# Patient Record
Sex: FEMALE | Race: BLACK OR AFRICAN AMERICAN | NOT HISPANIC OR LATINO | Employment: OTHER | URBAN - NONMETROPOLITAN AREA
[De-identification: names, ages, dates, MRNs, and addresses within clinical notes are randomized per-mention and may not be internally consistent; named-entity substitution may affect disease eponyms.]

---

## 2024-07-19 ENCOUNTER — OFFICE VISIT (OUTPATIENT)
Dept: OTOLARYNGOLOGY | Facility: CLINIC | Age: 69
End: 2024-07-19
Payer: MEDICARE

## 2024-07-19 DIAGNOSIS — H93.13 TINNITUS OF BOTH EARS: Primary | ICD-10-CM

## 2024-07-19 DIAGNOSIS — H91.93 BILATERAL HEARING LOSS, UNSPECIFIED HEARING LOSS TYPE: ICD-10-CM

## 2024-07-19 DIAGNOSIS — H91.93 DECREASED HEARING OF BOTH EARS: ICD-10-CM

## 2024-07-19 PROCEDURE — 1159F MED LIST DOCD IN RCRD: CPT | Performed by: NURSE PRACTITIONER

## 2024-07-19 PROCEDURE — 1160F RVW MEDS BY RX/DR IN RCRD: CPT | Performed by: NURSE PRACTITIONER

## 2024-07-19 PROCEDURE — 99203 OFFICE O/P NEW LOW 30 MIN: CPT | Performed by: NURSE PRACTITIONER

## 2024-07-26 NOTE — PROGRESS NOTES
YONI Weldon  [facility] EAR NOSE & THROAT  [address], [address]  [facility] [address]  Fax [phone]  Phone [phone]      Visit Type: NEW PATIENT   Chief Complaint   Patient presents with    [facility]     NEW PATIENT, Unspecified hearing loss, bilateral, REFERRED BY ISIDRA CORONADO  Rosa Luis is a 69 y.o. female who presents for evaluation of for ear complaints.  She admits to hearing loss and tinnitus to ears bilaterally.  This has been present for years, progressively getting worse.  Tinnitus is described as constant, buzzing.  States this does wax and wane but denies notable improving or worsening factors.  Denies ear pain drainage fullness or dizziness.      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 No pertinent past medical history.    No past surgical history on file.    Family History: Her family history is not on file.     Social History: She  has no history on file for tobacco use, alcohol use, and drug use.    Home Medications:       Allergies:  She has No Known Allergies.       Vital Signs:      ENT Physical Exam  Constitutional  Appearance: patient appears well-developed, well-nourished and well-groomed,  Communication/Voice: communication appropriate for developmental age; vocal quality normal;  Head and Face  Appearance: head appears normal, face appears normal and face appears atraumatic;  Palpation: facial palpation normal;  Ear  Hearing: intact;  Auricles: bilateral auricles normal;  External Mastoids: bilateral external mastoids normal;  Ear Canals: bilateral ear canals normal;  Tympanic Membranes: bilateral tympanic membranes normal;  Nose  External Nose: nares patent bilaterally; external nose normal;  Internal Nose: nasal mucosa normal; bilateral inferior turbinates normal;  Oral Cavity/Oropharynx  Lips: normal;  Teeth: normal;  Gums: gingiva normal;  Tongue:  normal;  Oral mucosa: normal;  Hard palate: normal;  Soft palate: normal;  Tonsils: normal;  Neck  Neck: neck normal; neck palpation normal;  Respiratory  Inspection: breathing unlabored;  Cardiovascular  Inspection: extremities are warm and well perfused;  Neurovestibular  Mental Status: alert and oriented;           Result Review       RESULTS REVIEW    I have reviewed the patients old records in the chart.   The following results/records were reviewed:            Assessment & Plan  Tinnitus of both ears    Decreased hearing of both ears    Bilateral hearing loss, unspecified hearing loss type              Medical and surgical options were discussed including observation, continued medical management, medication modification, surgical management, and home masking. Risks, benefits and alternatives were discussed and questions were answered. After considering the options, the patient decided to proceed with continued medical management.  Call for ear problems, especially change of hearing, ear pain or dizziness.  Protect getting water in the ears. If needed, may use over the counter silicone plugs or a cotton ball coated with vasoline when bathing.  Use hairdryer on a cool setting after bathing.  Use hearing protection in loud noise situations.  Use home masking techniques- use low sound level of a pleasant sound like a fan or radio at night to drown out the tinnitus sound. If not working, can consider formal masking device through our hearing aid department.  Audio follow-up  Home tinnitus recommendations  Further recommendations to be made after audio, she may need to consider hearing aids pending results living    Call with questions or concerns      Return in about 2 months (around 9/19/2024) for Recheck with audio.        Electronic chart created after visit due to downtime procedures date of visit  Electronically signed by YONI Weldon 07/26/24 9:01 AM CDT.

## 2024-08-30 ENCOUNTER — OFFICE VISIT (OUTPATIENT)
Dept: OTOLARYNGOLOGY | Facility: CLINIC | Age: 69
End: 2024-08-30
Payer: MEDICARE

## 2024-08-30 VITALS
HEIGHT: 63 IN | SYSTOLIC BLOOD PRESSURE: 121 MMHG | HEART RATE: 69 BPM | TEMPERATURE: 98.2 F | BODY MASS INDEX: 21.44 KG/M2 | WEIGHT: 121 LBS | DIASTOLIC BLOOD PRESSURE: 75 MMHG

## 2024-08-30 DIAGNOSIS — H91.93 DECREASED HEARING OF BOTH EARS: ICD-10-CM

## 2024-08-30 DIAGNOSIS — H93.93 UNSPECIFIED DISORDER OF EAR, BILATERAL: ICD-10-CM

## 2024-08-30 DIAGNOSIS — H91.93 BILATERAL HEARING LOSS, UNSPECIFIED HEARING LOSS TYPE: ICD-10-CM

## 2024-08-30 DIAGNOSIS — H93.13 TINNITUS OF BOTH EARS: Primary | ICD-10-CM

## 2024-08-30 RX ORDER — ROSUVASTATIN CALCIUM 40 MG/1
40 TABLET, COATED ORAL DAILY
COMMUNITY
Start: 2024-05-29

## 2024-08-30 RX ORDER — LISINOPRIL AND HYDROCHLOROTHIAZIDE 12.5; 2 MG/1; MG/1
1 TABLET ORAL DAILY
COMMUNITY
Start: 2024-08-05 | End: 2024-11-04

## 2024-08-30 NOTE — PATIENT INSTRUCTIONS
TINNITUS  Tinnitus (latin for ringing) is the sensation of noise in the ears. This symptom can be quite variable and disconcerting. Most people have had ringing in the ears but most do not require treatment. Only 6% of people have ringing troubling enough to seek treatment.    Symptoms can range from ringing to “noise” of any sort in the ear or ears. Timing can vary as well. There are no specific symptom requirements to have tinnitus. It is speculated that the inner ear hair cells (responsible for hearing) may be dying and causing the brain to seek additional input for sound that is missing. There are many theories for the etiology of tinnitus or ringing.    You may be referred for hearing testing or imaging of the ears/brain to try to determine what is causing ringing and how to best treat the condition.    Tinnitus Recommendations-  >Avoid loud or sudden noise  >Wear hearing protection in the presence of loud noise  >Avoid irritants like caffeine, nicotine, tonic water, malaria medications, alcohol, aspirin- please tell MD if you are taking any of these medications  >In a quiet environment or while sleeping, use a white noise generator or radio station to provide background noise  >Relaxation and stress management techniques are useful  >Biofeedback  >Complementary medicine may be of benefit  >Wear a hearing aid or tinnitus masker/retrainer  >Psychological counseling may be beneficial in some situations  >Exercise!!  >Restorative Sleep!!    Medical therapy for ringing (may include)-  Do nothing  Medications for ringing  IV medication  Ear perfusion- inner ear surgery to reduce ringing  Hearing Aids, Tinnitus maskers, Tinnitus retrainers  Biofeedback  Psychological counseling    All options will be reviewed at your visit. Treating tinnitus can be difficult and frustrating. There really is no cure but rather control. This can be time consuming to treat. The patient determines how much treatment is warranted if there  are no associated medical conditions requiring therapy. Please be patient.     SLEEP HINTS:  To sleep better you should:  >Turn off the TV! Turn off cell phone!  >Sleep in your own bed  >Darken the room  >Cool the bedroom  >Background noise is acceptable (music, white noise, sound generator)  >Paint the bedroom a comforting color  >Keep to a sleep schedule (go to bed and get up at the same time)  >Try to get 8 continuous hours of sleep a night  >Avoid alcohol, caffeine and recreational drug use  >Avoid sedating medications  >Avoid napping  >Diet  >Exercise (avoid within 2 hours of bedtime)  >Try Melatonin or Chamomile tea  >Wear Your CPAP (if you are supposed to)  >Make sure you are doing all you an to manage chronic health conditions

## 2024-08-30 NOTE — PROGRESS NOTES
YONI Weldon  [facility] EAR NOSE & THROAT  [address], [address]  [place] [address]  Fax [phone]  Phone [phone]      Visit Type: FOLLOW UP   Chief Complaint   Patient presents with    Tinnitus     Bilateral, present for years but getting worse           Tinnitus      Rosa Luis is a 69 y.o. female who presents for follow up evaluation, recheck ear complaints.   She admits to hearing loss and tinnitus to ears bilaterally.  This has been present for years, progressively getting worse.  Tinnitus is described as constant, buzzing.  States this does wax and wane but denies notable improving or worsening factors.  Denies ear pain drainage fullness or dizziness.           Past Medical History:   Diagnosis Date    Hyperlipidemia     Hypertension     Osteoporosis        History reviewed. No pertinent surgical history.    Family History: Her family history is not on file.     Social History: She  reports that she has never smoked. She has never used smokeless tobacco. She reports that she does not drink alcohol and does not use drugs.    Home Medications:  lisinopril-hydrochlorothiazide and rosuvastatin    Allergies:  She has No Known Allergies.       Vital Signs:   Temp:  [98.2 °F (36.8 °C)] 98.2 °F (36.8 °C)  Heart Rate:  [69] 69  BP: (121)/(75) 121/75  ENT Physical Exam  Constitutional  Appearance: patient appears well-developed, well-nourished and well-groomed,  Communication/Voice: communication appropriate for developmental age; vocal quality normal;  Head and Face  Appearance: head appears normal, face appears normal and face appears atraumatic;  Palpation: facial palpation normal;  Ear  Hearing: intact;  Auricles: bilateral auricles normal;  External Mastoids: bilateral external mastoids normal;  Ear Canals: bilateral ear canals normal;  Tympanic Membranes: bilateral tympanic membranes normal;  Nose  External Nose: nares patent  bilaterally; external nose normal;  Internal Nose: nasal mucosa normal; bilateral inferior turbinates normal;  Oral Cavity/Oropharynx  Lips: normal;  Teeth: normal;  Gums: gingiva normal;  Tongue: normal;  Oral mucosa: normal;  Hard palate: normal;  Soft palate: normal;  Tonsils: normal;  Neck  Neck: neck normal; neck palpation normal;  Respiratory  Inspection: breathing unlabored;  Cardiovascular  Inspection: extremities are warm and well perfused;  Neurovestibular  Mental Status: alert and oriented;           Result Review       RESULTS REVIEW    I have reviewed the patients old records in the chart.             Assessment & Plan  Tinnitus of both ears    Decreased hearing of both ears    Bilateral hearing loss, unspecified hearing loss type    Unspecified disorder of ear, bilateral                  Medical and surgical options were discussed including observation, continued medical management, medication modification, and surgical management. Risks, benefits and alternatives were discussed and questions were answered. After considering the options, the patient decided to proceed with continued medical management.    Continue current management  Call for ear problems, especially change of hearing, ear pain or dizziness.  Protect getting water in the ears. If needed, may use over the counter silicone plugs or a cotton ball coated with vasoline when bathing.  Use hairdryer on a cool setting after bathing.  Use hearing protection in loud noise situations.  Use home masking techniques- use low sound level of a pleasant sound like a fan or radio at night to drown out the tinnitus sound. If not working, can consider formal masking device through our hearing aid department.  Home tinnitus recommendations  Audio at follow up    Call with questions or concerns    Return in about 3 months (around 11/30/2024) for Recheck with audio.        Electronically signed by YONI Weldon 08/30/24 12:12 PM CDT.

## 2024-11-12 ENCOUNTER — TELEPHONE (OUTPATIENT)
Dept: OTOLARYNGOLOGY | Facility: CLINIC | Age: 69
End: 2024-11-12
Payer: MEDICARE

## 2024-11-12 NOTE — TELEPHONE ENCOUNTER
Spoke w/pt w/new appt date and time 11/14/2024 at 01:30pm for the audio and at 02:00pm for the ofc visit

## 2024-11-14 ENCOUNTER — OFFICE VISIT (OUTPATIENT)
Dept: OTOLARYNGOLOGY | Facility: CLINIC | Age: 69
End: 2024-11-14
Payer: MEDICARE

## 2024-11-14 ENCOUNTER — PROCEDURE VISIT (OUTPATIENT)
Dept: OTOLARYNGOLOGY | Facility: CLINIC | Age: 69
End: 2024-11-14
Payer: MEDICARE

## 2024-11-14 VITALS
DIASTOLIC BLOOD PRESSURE: 79 MMHG | SYSTOLIC BLOOD PRESSURE: 139 MMHG | TEMPERATURE: 98.7 F | HEIGHT: 63 IN | WEIGHT: 121 LBS | BODY MASS INDEX: 21.44 KG/M2 | HEART RATE: 73 BPM

## 2024-11-14 DIAGNOSIS — H90.A31 MIXED CONDUCTIVE AND SENSORINEURAL HEARING LOSS OF RIGHT EAR WITH RESTRICTED HEARING OF LEFT EAR: Primary | ICD-10-CM

## 2024-11-14 DIAGNOSIS — H90.A22 SENSORINEURAL HEARING LOSS (SNHL) OF LEFT EAR WITH RESTRICTED HEARING OF RIGHT EAR: ICD-10-CM

## 2024-11-14 DIAGNOSIS — H93.13 TINNITUS, BILATERAL: ICD-10-CM

## 2024-11-14 NOTE — PROGRESS NOTES
YONI Weldon  [facility] EAR NOSE & THROAT  [address], [address]  [place] [address]  Fax [phone]  Phone [phone]      Visit Type: FOLLOW UP   Chief Complaint   Patient presents with    Tinnitus           HISTORY OBTAINED FROM: patient  Steffi Luis is a 69 y.o. female who presents for FOLLOW UP, recheck tinnitus. She admits to hearing loss and tinnitus to ears bilaterally. This has been present for years, progressively getting worse. Tinnitus is described as constant, buzzing. States this does wax and wane but denies notable improving or worsening factors. Denies ear pain drainage fullness or dizziness.     Since last visit she states she feels about the same. Denies new symptoms. Denies changes in hearing. Still having ongoing tinnitus.     Past Medical History:   Diagnosis Date    Hyperlipidemia     Hypertension     Osteoporosis        History reviewed. No pertinent surgical history.    Family History: Her family history is not on file.     Social History: She  reports that she has never smoked. She has never used smokeless tobacco. She reports that she does not drink alcohol and does not use drugs.    Home Medications:  lisinopril-hydrochlorothiazide and rosuvastatin    Allergies:  She has No Known Allergies.       Vital Signs:   Temp:  [98.7 °F (37.1 °C)] 98.7 °F (37.1 °C)  Heart Rate:  [73] 73  BP: (139)/(79) 139/79  ENT Physical Exam  Constitutional  Appearance: patient appears well-developed, well-nourished and well-groomed,  Communication/Voice: communication appropriate for developmental age; vocal quality normal;  Head and Face  Appearance: head appears normal, face appears normal and face appears atraumatic;  Ear  Hearing: intact;  Auricles: bilateral auricles normal;  External Mastoids: bilateral external mastoids normal;  Ear Canals: bilateral ear canals normal;  Tympanic Membranes: bilateral tympanic  membranes normal;  Nose  External Nose: nares patent bilaterally; external nose normal;  Oral Cavity/Oropharynx  Lips: normal;  Neck  Neck: neck normal; neck palpation normal;  Respiratory  Inspection: breathing unlabored;  Cardiovascular  Inspection: extremities are warm and well perfused;  Neurovestibular  Mental Status: alert and oriented;           Result Review       RESULTS REVIEW    I have reviewed the patients old records in the chart.   The following results/records were reviewed:     Procedure visit with Daya Gong AUD (11/14/2024)          Assessment & Plan  Mixed conductive and sensorineural hearing loss of right ear with restricted hearing of left ear    Tinnitus, bilateral    Sensorineural hearing loss (SNHL) of left ear with restricted hearing of right ear              Audio obtained and reviewed in office patient.  Shows bilateral sensorineural hearing loss, per audiologist there is a mild conductive component to the right ear.  Tympanometry is type a S bilaterally, word rec scores are present bilaterally.  Otoscopic exam is relatively normal today.  At this point symptoms have been relatively stable between her visits, does appear to be some chronic hearing loss that is more likely causing her tinnitus.  Given her symptoms I did advise she would benefit from hearing aids, states she will consider.  Otherwise for now would advise continued home tinnitus recommendations, she states she has not really given home recommendations ample effort to see if they work and is agreeable to continue these for now.  I did discuss consideration of Lipoflavonoid or basic medication trial but we will forego this for now.  I will plan to recheck again in 3 to 4 months, she will call with any worsening or new symptoms.  Advised continued observation with routine audiograms.      Medical and surgical options were discussed including observation, continued medical management, medication modification, and  surgical management. Risks, benefits and alternatives were discussed and questions were answered. After considering the options, the patient decided to proceed with observation and continued medical management.  Call for ear problems, especially change of hearing, ear pain or dizziness.  Protect getting water in the ears. If needed, may use over the counter silicone plugs or a cotton ball coated with vasoline when bathing.  Use hairdryer on a cool setting after bathing.  Use hearing protection in loud noise situations.  Consider hearing aid amplification.  Use home masking techniques- use low sound level of a pleasant sound like a fan or radio at night to drown out the tinnitus sound. If not working, can consider formal masking device through our hearing aid department.  Home tinnitus recommendations  Masking  Can consider Lipoflavonoid  Consider hearing aids    Call with questions or concerns    Return in about 6 months (around 5/14/2025) for Recheck tinnitus .        Electronically signed by YONI Weldon 11/14/24 4:23 PM CST.

## 2024-11-14 NOTE — PATIENT INSTRUCTIONS
Can consider Lipo-Flavonoid     TINNITUS  Tinnitus (latin for ringing) is the sensation of noise in the ears. This symptom can be quite variable and disconcerting. Most people have had ringing in the ears but most do not require treatment. Only 6% of people have ringing troubling enough to seek treatment.    Symptoms can range from ringing to “noise” of any sort in the ear or ears. Timing can vary as well. There are no specific symptom requirements to have tinnitus. It is speculated that the inner ear hair cells (responsible for hearing) may be dying and causing the brain to seek additional input for sound that is missing. There are many theories for the etiology of tinnitus or ringing.    You may be referred for hearing testing or imaging of the ears/brain to try to determine what is causing ringing and how to best treat the condition.    Tinnitus Recommendations-  >Avoid loud or sudden noise  >Wear hearing protection in the presence of loud noise  >Avoid irritants like caffeine, nicotine, tonic water, malaria medications, alcohol, aspirin- please tell MD if you are taking any of these medications  >In a quiet environment or while sleeping, use a white noise generator or radio station to provide background noise  >Relaxation and stress management techniques are useful  >Biofeedback  >Complementary medicine may be of benefit  >Wear a hearing aid or tinnitus masker/retrainer  >Psychological counseling may be beneficial in some situations  >Exercise!!  >Restorative Sleep!!    Medical therapy for ringing (may include)-  Do nothing  Medications for ringing  IV medication  Ear perfusion- inner ear surgery to reduce ringing  Hearing Aids, Tinnitus maskers, Tinnitus retrainers  Biofeedback  Psychological counseling    All options will be reviewed at your visit. Treating tinnitus can be difficult and frustrating. There really is no cure but rather control. This can be time consuming to treat. The patient determines how much  treatment is warranted if there are no associated medical conditions requiring therapy. Please be patient.

## 2024-11-14 NOTE — PROGRESS NOTES
AUDIOMETRIC EVALUATION      Name:  Rosa Luis  :  1955  Age:  69 y.o.  Date of Evaluation:  2024       History:  Ms. Luis is seen today for a hearing evaluation due to tinnitus at the request of YONI Cadena.    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  Family history of hearing loss: No  Head trauma requiring hospital stay: No  Chemotherapy: No  Other significant history: hypertension    **Case history obtained in office and through EMR system      EVALUATION:        RESULTS:    Otoscopic Evaluation:  Right: clear canal, tympanic membrane visualized  Left: clear canal, tympanic membrane visualized    Tympanometry (226 Hz):  Right: Type As  Left: Type As    Pure Tone Audiometry:    Right: Normal at 250Hz, Mild (500Hz-3000Hz) sloping to moderately severe (8000Hz) mixed hearing loss  Left: Normal hearing thresholds (250Hz-2000Hz) sloping to moderately severe (8000Hz) sensorineural hearing loss     Speech Audiometry:   Right: Speech Reception Threshold (SRT) was obtained at 25 dB HL  Word Recognition scores - Excellent (100)% at 65 dB, using NU-6 List 3A with 10 words  Left: Speech Reception Threshold (SRT) was obtained at 25 dB HL  Word Recognition scores - Excellent (100)% at 65 dB, using NU-6 List 3A with 10 words  SRT/PTA in good agreement bilaterally.    IMPRESSIONS:  Tympanometry showed normal middle ear pressure with reduced static compliance, consistent with hypomobile tympanic membrane, for both ears. Pure tone thresholds for the right ear show mixed hearing loss, suggesting abnormal outer/middle ear function and abnormal cochlear/retrocochlear function. Pure tone thresholds for the left ear show sensorineural hearing loss, suggesting normal  outer/middle ear function and abnormal cochlear/retrocochlear function. Patient was counseled with regard to the findings.    Amplification needs: Patient could benefit from hearing aids. Patient might have relief from their tinnitus with hearing aid use.     Diagnosis:  1. Mixed conductive and sensorineural hearing loss of right ear with restricted hearing of left ear    2. Sensorineural hearing loss (SNHL) of left ear with restricted hearing of right ear    3. Tinnitus, bilateral         RECOMMENDATIONS/PLAN:  Follow-up recommendations per YONI Cadena.  Practice good communication strategies to assist with everyday listening (eye contact with speakers, reduce background noise, encourage others to communicate clearly and slowly).  Tinnitus management strategies. Consider use of amplification, a white noise machine, low level TV/radio, or fan at night to help mask the tinnitus. It is also recommended to avoid caffeine, alcohol, tobacco, salt, high dose NSAIDs, and to increase hydration. Additional resources: Resound Relief jai and American Tinnitus Association (www.katie.org).  Consistent utilization of hearing protection devices in noisy environments.  Hearing aid evaluation and counseling upon medical clearance and patient motivation.   Repeat hearing evaluation if changes in hearing are noted or concerns arise.  Discussed results and recommendations with patient. Questions were addressed and the patient was encouraged to contact our department should concerns arise.        Daya Hyatt, CCC-DEANDRE, F-AAA  Doctor of Audiology